# Patient Record
Sex: FEMALE | Race: WHITE
[De-identification: names, ages, dates, MRNs, and addresses within clinical notes are randomized per-mention and may not be internally consistent; named-entity substitution may affect disease eponyms.]

---

## 2020-02-23 ENCOUNTER — HOSPITAL ENCOUNTER (EMERGENCY)
Dept: HOSPITAL 72 - EMR | Age: 45
Discharge: HOME | End: 2020-02-23
Payer: COMMERCIAL

## 2020-02-23 VITALS — HEIGHT: 62 IN | WEIGHT: 130 LBS | BODY MASS INDEX: 23.92 KG/M2

## 2020-02-23 VITALS — DIASTOLIC BLOOD PRESSURE: 107 MMHG | SYSTOLIC BLOOD PRESSURE: 162 MMHG

## 2020-02-23 DIAGNOSIS — H01.9: Primary | ICD-10-CM

## 2020-02-23 PROCEDURE — 99282 EMERGENCY DEPT VISIT SF MDM: CPT

## 2020-02-23 NOTE — EMERGENCY ROOM REPORT
History of Present Illness


General


Chief Complaint:  Eye Problems


Source:  Patient





Present Illness


HPI


Patient presents with inflammation of the lower eyelid.  This is been worsened 

over the last 2 to 3 days.  She denies change in her vision.  There is been 

some itching and inflammation lateral side of her right eye.  Is been no fever 

or chills.  There is no crusty discharge.  Pain is rated 2/10.  Has not taken 

any medications.  This has happened in the past.





Patient wears glasses.  She knows she has a pterygium of the left eye.





No upper respiratory symptoms.





Last menstruation normal.





Patient is stressed and concerned about her father who is quite ill.


Allergies:  


Coded Allergies:  


     No Known Allergies (Unverified , 2/23/20)





Patient History


Past Medical History:  see triage record


Social History:  Denies: smoking


Social History Narrative


Father being admitted to ICU


Last Menstrual Period:  01/28/20


Pregnant Now:  No


Reviewed Nursing Documentation:  PMH: Agreed; PSxH: Agreed





Nursing Documentation-PMH


Past Medical History:  No Stated History





Review of Systems


Constitutional:  Denies: fever


Eye:  Reports: see HPI


ENT:  Denies: nose congestion, throat swelling, nasal discharge


Gastrointestinal:  Denies: nausea


Genitourinary:  Reports: see HPI


Skin:  Reports: see HPI


Neurological:  Denies: headache





Physical Exam





Vital Signs








  Date Time  Temp Pulse Resp B/P (MAP) Pulse Ox O2 Delivery O2 Flow Rate FiO2


 


2/23/20 18:34 98.4 86 19 162/107 (125) 99 Room Air  








Sp02 EP Interpretation:  reviewed, normal


General Appearance:  well appearing, no apparent distress, GCS 15


Head:  normocephalic, atraumatic


Eyes:  left eye Fundiscopic - Normal, left eye other; bilateral eye PERRL, 

bilateral eye EOMI, bilateral eye Scleral Injection


ENT:  normal pharynx, moist mucus membranes


Neck:  normal inspection


Respiratory:  normal inspection


Cardiovascular #1:  regular rate, rhythm


Cardiovascular #2:  2+ radial (R)


Gastrointestinal:  normal inspection


Musculoskeletal:  gait/station normal


Neurologic:  alert, grossly normal


Psychiatric:  other - Concerned about her father


Skin:  other - Lower leg inflammation and erythema





Medical Decision Making


Diagnostic Impression:  


 Primary Impression:  


 Eye inflammation


ER Course


Patient presents with right eye inflammation with some skin changes of the 

lower lid.  Differential includes cellulitis, conjunctivitis amongst others.  

Treatment with topical antibiotics and ophthalmic antibiotics and anti-

inflammatory medication.  Vision unchanged from baseline.





Discussed treatment plan with patient.





Patient stable for outpatient observation and treatment.


Last Vital Signs








  Date Time  Temp Pulse Resp B/P (MAP) Pulse Ox O2 Delivery O2 Flow Rate FiO2


 


2/23/20 18:58 98.4 86 19 162/107 99 Room Air  





Accuracy of final vital signs


Status:  improved


Disposition:  HOME, SELF-CARE


Condition:  Improved


Scripts


Naphazoline Hcl/Pheniramine (OPCON-A EYE DROPS) 15 Ml Drops


1 DRP OP Q6HR PRN for inflammation, #10 ML


   Prov: West Orourke MD         2/23/20 


Sulfacetamide Sodium (BLEPH-10) 5 Ml Drops


2 DROP OP Q6HR, #5 ML


   Prov: West Orourke MD         2/23/20 


Bacitracin (Bacitracin) 28.4 Gm Oint...g.


1 APPLIC TOPIC BID, #10 GM


   Prov: West Orourke MD         2/23/20











West Orourke MD Feb 23, 2020 18:46

## 2020-02-25 ENCOUNTER — HOSPITAL ENCOUNTER (EMERGENCY)
Dept: HOSPITAL 1 - ED | Age: 45
Discharge: HOME | End: 2020-02-25
Payer: MEDICAID

## 2020-02-25 VITALS
HEIGHT: 62 IN | WEIGHT: 135 LBS | WEIGHT: 135 LBS | HEIGHT: 62 IN | BODY MASS INDEX: 24.84 KG/M2 | BODY MASS INDEX: 24.84 KG/M2

## 2020-02-25 VITALS — DIASTOLIC BLOOD PRESSURE: 83 MMHG | SYSTOLIC BLOOD PRESSURE: 146 MMHG

## 2020-02-25 DIAGNOSIS — L08.9: Primary | ICD-10-CM

## 2020-02-25 DIAGNOSIS — H57.89: ICD-10-CM
